# Patient Record
Sex: FEMALE | URBAN - METROPOLITAN AREA
[De-identification: names, ages, dates, MRNs, and addresses within clinical notes are randomized per-mention and may not be internally consistent; named-entity substitution may affect disease eponyms.]

---

## 2023-03-03 ENCOUNTER — APPOINTMENT (OUTPATIENT)
Dept: URBAN - METROPOLITAN AREA CLINIC 251 | Age: 68
Setting detail: DERMATOLOGY
End: 2023-03-07

## 2023-03-03 DIAGNOSIS — L57.8 OTHER SKIN CHANGES DUE TO CHRONIC EXPOSURE TO NONIONIZING RADIATION: ICD-10-CM

## 2023-03-03 PROBLEM — C44.1192 BASAL CELL CARCINOMA OF SKIN OF LEFT LOWER EYELID, INCLUDING CANTHUS: Status: ACTIVE | Noted: 2023-03-03

## 2023-03-03 PROCEDURE — 99203 OFFICE O/P NEW LOW 30 MIN: CPT

## 2023-03-03 PROCEDURE — OTHER MIPS QUALITY: OTHER

## 2023-03-03 PROCEDURE — OTHER COUNSELING: OTHER

## 2023-03-03 NOTE — PROCEDURE: COUNSELING
Patient Specific Counseling (Will Not Stick From Patient To Patient): Discussed with pt that due to location of lesion that she will need to seek out ocular plastics or facial plastics for removal.  Pt had been referred by another dermatologist to somebody in [place] ,but she is looking for someone closer.   Pt to find surgeon in her [employer] network and we will send all paperwork from this office. Patient Specific Counseling (Will Not Stick From Patient To Patient): Discussed with pt that due to location of lesion that she will need to seek out ocular plastics or facial plastics for removal.  Pt had been referred by another dermatologist to somebody in [place] ,but she is looking for someone closer.   Pt to find surgeon in her [employer]urn[employer] network and we will send all paperwork from this office.

## 2023-03-17 ENCOUNTER — APPOINTMENT (OUTPATIENT)
Dept: URBAN - METROPOLITAN AREA CLINIC 251 | Age: 68
Setting detail: DERMATOLOGY
End: 2023-03-18

## 2023-03-17 PROBLEM — C44.1192 BASAL CELL CARCINOMA OF SKIN OF LEFT LOWER EYELID, INCLUDING CANTHUS: Status: ACTIVE | Noted: 2023-03-17

## 2023-03-17 PROCEDURE — 17312 MOHS ADDL STAGE: CPT

## 2023-03-17 PROCEDURE — OTHER MOHS SURGERY: OTHER

## 2023-03-17 PROCEDURE — A4550 SURGICAL TRAYS: HCPCS

## 2023-03-17 PROCEDURE — 13151 CMPLX RPR E/N/E/L 1.1-2.5 CM: CPT

## 2023-03-17 PROCEDURE — 17311 MOHS 1 STAGE H/N/HF/G: CPT

## 2023-03-17 PROCEDURE — OTHER COUNSELING: OTHER

## 2023-03-23 ENCOUNTER — APPOINTMENT (OUTPATIENT)
Dept: URBAN - METROPOLITAN AREA CLINIC 251 | Age: 68
Setting detail: DERMATOLOGY
End: 2023-04-12

## 2023-03-23 DIAGNOSIS — Z48.02 ENCOUNTER FOR REMOVAL OF SUTURES: ICD-10-CM

## 2023-03-23 PROCEDURE — OTHER SUTURE REMOVAL (GLOBAL PERIOD): OTHER

## 2023-03-23 ASSESSMENT — LOCATION ZONE DERM: LOCATION ZONE: NOSE

## 2023-03-23 ASSESSMENT — LOCATION DETAILED DESCRIPTION DERM: LOCATION DETAILED: NASAL ROOT

## 2023-03-23 ASSESSMENT — LOCATION SIMPLE DESCRIPTION DERM: LOCATION SIMPLE: NOSE

## 2023-03-23 NOTE — PROCEDURE: SUTURE REMOVAL (GLOBAL PERIOD)
Add 85440 Cpt? (Important Note: In 2017 The Use Of 99239 Is Being Tracked By Cms To Determine Future Global Period Reimbursement For Global Periods): no
Detail Level: Detailed